# Patient Record
Sex: FEMALE | Race: OTHER | URBAN - METROPOLITAN AREA
[De-identification: names, ages, dates, MRNs, and addresses within clinical notes are randomized per-mention and may not be internally consistent; named-entity substitution may affect disease eponyms.]

---
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
## 2024-01-05 ENCOUNTER — EMERGENCY (EMERGENCY)
Facility: HOSPITAL | Age: 19
LOS: 0 days | Discharge: HOME | End: 2024-01-06
Attending: EMERGENCY MEDICINE
Payer: MEDICAID

## 2024-01-05 VITALS
DIASTOLIC BLOOD PRESSURE: 71 MMHG | WEIGHT: 156.09 LBS | OXYGEN SATURATION: 100 % | RESPIRATION RATE: 18 BRPM | SYSTOLIC BLOOD PRESSURE: 116 MMHG | TEMPERATURE: 98 F | HEART RATE: 75 BPM

## 2024-01-05 DIAGNOSIS — N92.0 EXCESSIVE AND FREQUENT MENSTRUATION WITH REGULAR CYCLE: ICD-10-CM

## 2024-01-05 DIAGNOSIS — R10.2 PELVIC AND PERINEAL PAIN: ICD-10-CM

## 2024-01-05 DIAGNOSIS — N93.9 ABNORMAL UTERINE AND VAGINAL BLEEDING, UNSPECIFIED: ICD-10-CM

## 2024-01-05 PROCEDURE — 99284 EMERGENCY DEPT VISIT MOD MDM: CPT | Mod: 25

## 2024-01-05 PROCEDURE — 81001 URINALYSIS AUTO W/SCOPE: CPT

## 2024-01-05 PROCEDURE — 76856 US EXAM PELVIC COMPLETE: CPT

## 2024-01-05 PROCEDURE — 80053 COMPREHEN METABOLIC PANEL: CPT

## 2024-01-05 PROCEDURE — 85025 COMPLETE CBC W/AUTO DIFF WBC: CPT

## 2024-01-05 PROCEDURE — 36415 COLL VENOUS BLD VENIPUNCTURE: CPT

## 2024-01-05 PROCEDURE — 99284 EMERGENCY DEPT VISIT MOD MDM: CPT

## 2024-01-05 PROCEDURE — 87086 URINE CULTURE/COLONY COUNT: CPT

## 2024-01-05 NOTE — ED PEDIATRIC NURSE NOTE - NS PRO PASSIVE SMOKE EXP
arterial puncture to obtain ABG's/critical patient/monitoring purposes pneumothorax hypertonic/irritant infusion/venous access/critical illness Unknown

## 2024-01-05 NOTE — ED PEDIATRIC TRIAGE NOTE - CHIEF COMPLAINT QUOTE
pt c/o heavy menstrual bleeding associated with pelvic pain for her last 4 cycles. pt reporting clots as well

## 2024-01-05 NOTE — ED PEDIATRIC NURSE NOTE - NSSUHOSCREENINGYN_ED_ALL_ED
Have You Had Laser Removal Of Brown Spots Before?: has not had previous treatment
When Outside In The Sun, Do You...: rarely burns, mostly tans
Yes - the patient is able to be screened

## 2024-01-06 LAB
ALBUMIN SERPL ELPH-MCNC: 4.4 G/DL — SIGNIFICANT CHANGE UP (ref 3.5–5.2)
ALBUMIN SERPL ELPH-MCNC: 4.4 G/DL — SIGNIFICANT CHANGE UP (ref 3.5–5.2)
ALP SERPL-CCNC: 103 U/L — SIGNIFICANT CHANGE UP (ref 30–115)
ALP SERPL-CCNC: 103 U/L — SIGNIFICANT CHANGE UP (ref 30–115)
ALT FLD-CCNC: 142 U/L — HIGH (ref 14–37)
ALT FLD-CCNC: 142 U/L — HIGH (ref 14–37)
ANION GAP SERPL CALC-SCNC: 10 MMOL/L — SIGNIFICANT CHANGE UP (ref 7–14)
ANION GAP SERPL CALC-SCNC: 10 MMOL/L — SIGNIFICANT CHANGE UP (ref 7–14)
APPEARANCE UR: CLEAR — SIGNIFICANT CHANGE UP
APPEARANCE UR: CLEAR — SIGNIFICANT CHANGE UP
AST SERPL-CCNC: 57 U/L — HIGH (ref 14–37)
AST SERPL-CCNC: 57 U/L — HIGH (ref 14–37)
BACTERIA # UR AUTO: NEGATIVE /HPF — SIGNIFICANT CHANGE UP
BACTERIA # UR AUTO: NEGATIVE /HPF — SIGNIFICANT CHANGE UP
BASOPHILS # BLD AUTO: 0.03 K/UL — SIGNIFICANT CHANGE UP (ref 0–0.2)
BASOPHILS # BLD AUTO: 0.03 K/UL — SIGNIFICANT CHANGE UP (ref 0–0.2)
BASOPHILS NFR BLD AUTO: 0.4 % — SIGNIFICANT CHANGE UP (ref 0–1)
BASOPHILS NFR BLD AUTO: 0.4 % — SIGNIFICANT CHANGE UP (ref 0–1)
BILIRUB SERPL-MCNC: 0.7 MG/DL — SIGNIFICANT CHANGE UP (ref 0.2–1.2)
BILIRUB SERPL-MCNC: 0.7 MG/DL — SIGNIFICANT CHANGE UP (ref 0.2–1.2)
BILIRUB UR-MCNC: NEGATIVE — SIGNIFICANT CHANGE UP
BILIRUB UR-MCNC: NEGATIVE — SIGNIFICANT CHANGE UP
BUN SERPL-MCNC: 10 MG/DL — SIGNIFICANT CHANGE UP (ref 10–20)
BUN SERPL-MCNC: 10 MG/DL — SIGNIFICANT CHANGE UP (ref 10–20)
CALCIUM SERPL-MCNC: 9.6 MG/DL — SIGNIFICANT CHANGE UP (ref 8.4–10.5)
CALCIUM SERPL-MCNC: 9.6 MG/DL — SIGNIFICANT CHANGE UP (ref 8.4–10.5)
CAST: 0 /LPF — SIGNIFICANT CHANGE UP (ref 0–4)
CAST: 0 /LPF — SIGNIFICANT CHANGE UP (ref 0–4)
CHLORIDE SERPL-SCNC: 105 MMOL/L — SIGNIFICANT CHANGE UP (ref 98–110)
CHLORIDE SERPL-SCNC: 105 MMOL/L — SIGNIFICANT CHANGE UP (ref 98–110)
CO2 SERPL-SCNC: 26 MMOL/L — SIGNIFICANT CHANGE UP (ref 17–32)
CO2 SERPL-SCNC: 26 MMOL/L — SIGNIFICANT CHANGE UP (ref 17–32)
COLOR SPEC: YELLOW — SIGNIFICANT CHANGE UP
COLOR SPEC: YELLOW — SIGNIFICANT CHANGE UP
CREAT SERPL-MCNC: 0.5 MG/DL — SIGNIFICANT CHANGE UP (ref 0.3–1)
CREAT SERPL-MCNC: 0.5 MG/DL — SIGNIFICANT CHANGE UP (ref 0.3–1)
DIFF PNL FLD: ABNORMAL
DIFF PNL FLD: ABNORMAL
EGFR: 138 ML/MIN/1.73M2 — SIGNIFICANT CHANGE UP
EGFR: 138 ML/MIN/1.73M2 — SIGNIFICANT CHANGE UP
EOSINOPHIL # BLD AUTO: 0.1 K/UL — SIGNIFICANT CHANGE UP (ref 0–0.7)
EOSINOPHIL # BLD AUTO: 0.1 K/UL — SIGNIFICANT CHANGE UP (ref 0–0.7)
EOSINOPHIL NFR BLD AUTO: 1.4 % — SIGNIFICANT CHANGE UP (ref 0–8)
EOSINOPHIL NFR BLD AUTO: 1.4 % — SIGNIFICANT CHANGE UP (ref 0–8)
GLUCOSE SERPL-MCNC: 95 MG/DL — SIGNIFICANT CHANGE UP (ref 70–99)
GLUCOSE SERPL-MCNC: 95 MG/DL — SIGNIFICANT CHANGE UP (ref 70–99)
GLUCOSE UR QL: NEGATIVE MG/DL — SIGNIFICANT CHANGE UP
GLUCOSE UR QL: NEGATIVE MG/DL — SIGNIFICANT CHANGE UP
HCT VFR BLD CALC: 35.9 % — LOW (ref 37–47)
HCT VFR BLD CALC: 35.9 % — LOW (ref 37–47)
HGB BLD-MCNC: 12.3 G/DL — SIGNIFICANT CHANGE UP (ref 12–16)
HGB BLD-MCNC: 12.3 G/DL — SIGNIFICANT CHANGE UP (ref 12–16)
IMM GRANULOCYTES NFR BLD AUTO: 0.3 % — SIGNIFICANT CHANGE UP (ref 0.1–0.3)
IMM GRANULOCYTES NFR BLD AUTO: 0.3 % — SIGNIFICANT CHANGE UP (ref 0.1–0.3)
KETONES UR-MCNC: NEGATIVE MG/DL — SIGNIFICANT CHANGE UP
KETONES UR-MCNC: NEGATIVE MG/DL — SIGNIFICANT CHANGE UP
LEUKOCYTE ESTERASE UR-ACNC: NEGATIVE — SIGNIFICANT CHANGE UP
LEUKOCYTE ESTERASE UR-ACNC: NEGATIVE — SIGNIFICANT CHANGE UP
LYMPHOCYTES # BLD AUTO: 2.02 K/UL — SIGNIFICANT CHANGE UP (ref 1.2–3.4)
LYMPHOCYTES # BLD AUTO: 2.02 K/UL — SIGNIFICANT CHANGE UP (ref 1.2–3.4)
LYMPHOCYTES # BLD AUTO: 29.2 % — SIGNIFICANT CHANGE UP (ref 20.5–51.1)
LYMPHOCYTES # BLD AUTO: 29.2 % — SIGNIFICANT CHANGE UP (ref 20.5–51.1)
MCHC RBC-ENTMCNC: 28.7 PG — SIGNIFICANT CHANGE UP (ref 27–31)
MCHC RBC-ENTMCNC: 28.7 PG — SIGNIFICANT CHANGE UP (ref 27–31)
MCHC RBC-ENTMCNC: 34.3 G/DL — SIGNIFICANT CHANGE UP (ref 32–37)
MCHC RBC-ENTMCNC: 34.3 G/DL — SIGNIFICANT CHANGE UP (ref 32–37)
MCV RBC AUTO: 83.9 FL — SIGNIFICANT CHANGE UP (ref 81–99)
MCV RBC AUTO: 83.9 FL — SIGNIFICANT CHANGE UP (ref 81–99)
MONOCYTES # BLD AUTO: 0.39 K/UL — SIGNIFICANT CHANGE UP (ref 0.1–0.6)
MONOCYTES # BLD AUTO: 0.39 K/UL — SIGNIFICANT CHANGE UP (ref 0.1–0.6)
MONOCYTES NFR BLD AUTO: 5.6 % — SIGNIFICANT CHANGE UP (ref 1.7–9.3)
MONOCYTES NFR BLD AUTO: 5.6 % — SIGNIFICANT CHANGE UP (ref 1.7–9.3)
NEUTROPHILS # BLD AUTO: 4.35 K/UL — SIGNIFICANT CHANGE UP (ref 1.4–6.5)
NEUTROPHILS # BLD AUTO: 4.35 K/UL — SIGNIFICANT CHANGE UP (ref 1.4–6.5)
NEUTROPHILS NFR BLD AUTO: 63.1 % — SIGNIFICANT CHANGE UP (ref 42.2–75.2)
NEUTROPHILS NFR BLD AUTO: 63.1 % — SIGNIFICANT CHANGE UP (ref 42.2–75.2)
NITRITE UR-MCNC: NEGATIVE — SIGNIFICANT CHANGE UP
NITRITE UR-MCNC: NEGATIVE — SIGNIFICANT CHANGE UP
NRBC # BLD: 0 /100 WBCS — SIGNIFICANT CHANGE UP (ref 0–0)
NRBC # BLD: 0 /100 WBCS — SIGNIFICANT CHANGE UP (ref 0–0)
PH UR: 6.5 — SIGNIFICANT CHANGE UP (ref 5–8)
PH UR: 6.5 — SIGNIFICANT CHANGE UP (ref 5–8)
PLATELET # BLD AUTO: 288 K/UL — SIGNIFICANT CHANGE UP (ref 130–400)
PLATELET # BLD AUTO: 288 K/UL — SIGNIFICANT CHANGE UP (ref 130–400)
PMV BLD: 9.2 FL — SIGNIFICANT CHANGE UP (ref 7.4–10.4)
PMV BLD: 9.2 FL — SIGNIFICANT CHANGE UP (ref 7.4–10.4)
POTASSIUM SERPL-MCNC: 4.2 MMOL/L — SIGNIFICANT CHANGE UP (ref 3.5–5)
POTASSIUM SERPL-MCNC: 4.2 MMOL/L — SIGNIFICANT CHANGE UP (ref 3.5–5)
POTASSIUM SERPL-SCNC: 4.2 MMOL/L — SIGNIFICANT CHANGE UP (ref 3.5–5)
POTASSIUM SERPL-SCNC: 4.2 MMOL/L — SIGNIFICANT CHANGE UP (ref 3.5–5)
PROT SERPL-MCNC: 7.3 G/DL — SIGNIFICANT CHANGE UP (ref 6.1–8)
PROT SERPL-MCNC: 7.3 G/DL — SIGNIFICANT CHANGE UP (ref 6.1–8)
PROT UR-MCNC: NEGATIVE MG/DL — SIGNIFICANT CHANGE UP
PROT UR-MCNC: NEGATIVE MG/DL — SIGNIFICANT CHANGE UP
RBC # BLD: 4.28 M/UL — SIGNIFICANT CHANGE UP (ref 4.2–5.4)
RBC # BLD: 4.28 M/UL — SIGNIFICANT CHANGE UP (ref 4.2–5.4)
RBC # FLD: 12.8 % — SIGNIFICANT CHANGE UP (ref 11.5–14.5)
RBC # FLD: 12.8 % — SIGNIFICANT CHANGE UP (ref 11.5–14.5)
RBC CASTS # UR COMP ASSIST: 59 /HPF — HIGH (ref 0–4)
RBC CASTS # UR COMP ASSIST: 59 /HPF — HIGH (ref 0–4)
SODIUM SERPL-SCNC: 141 MMOL/L — SIGNIFICANT CHANGE UP (ref 135–146)
SODIUM SERPL-SCNC: 141 MMOL/L — SIGNIFICANT CHANGE UP (ref 135–146)
SP GR SPEC: 1.01 — SIGNIFICANT CHANGE UP (ref 1–1.03)
SP GR SPEC: 1.01 — SIGNIFICANT CHANGE UP (ref 1–1.03)
SQUAMOUS # UR AUTO: 0 /HPF — SIGNIFICANT CHANGE UP (ref 0–5)
SQUAMOUS # UR AUTO: 0 /HPF — SIGNIFICANT CHANGE UP (ref 0–5)
UROBILINOGEN FLD QL: 0.2 MG/DL — SIGNIFICANT CHANGE UP (ref 0.2–1)
UROBILINOGEN FLD QL: 0.2 MG/DL — SIGNIFICANT CHANGE UP (ref 0.2–1)
WBC # BLD: 6.91 K/UL — SIGNIFICANT CHANGE UP (ref 4.8–10.8)
WBC # BLD: 6.91 K/UL — SIGNIFICANT CHANGE UP (ref 4.8–10.8)
WBC # FLD AUTO: 6.91 K/UL — SIGNIFICANT CHANGE UP (ref 4.8–10.8)
WBC # FLD AUTO: 6.91 K/UL — SIGNIFICANT CHANGE UP (ref 4.8–10.8)
WBC UR QL: 0 /HPF — SIGNIFICANT CHANGE UP (ref 0–5)
WBC UR QL: 0 /HPF — SIGNIFICANT CHANGE UP (ref 0–5)

## 2024-01-06 PROCEDURE — 76856 US EXAM PELVIC COMPLETE: CPT | Mod: 26

## 2024-01-06 RX ORDER — IBUPROFEN 200 MG
400 TABLET ORAL ONCE
Refills: 0 | Status: COMPLETED | OUTPATIENT
Start: 2024-01-06 | End: 2024-01-06

## 2024-01-06 RX ADMIN — Medication 400 MILLIGRAM(S): at 00:45

## 2024-01-06 RX ADMIN — Medication 400 MILLIGRAM(S): at 00:12

## 2024-01-06 NOTE — ED PROVIDER NOTE - PATIENT PORTAL LINK FT
You can access the FollowMyHealth Patient Portal offered by [facility] by registering at the following website: [email or web address]. By joining [employer]’s FollowMyHealth portal, you will also be able to view your health information using other applications (apps) compatible with our system. You can access the FollowMyHealth Patient Portal offered by [facility] by registering at the following website: [email or web address]. By joining [employer]’s FollowMyHealth portal, you will also be able to view your health information using other applications (apps) compatible with our system. You can access the FollowMyHealth Patient Portal offered by [facility] by registering at the following website: [email or web address]. By joining [employer]’s FollowMyHealth portal, you will also be able to view your health information using other applications (apps) compatible with our system.

## 2024-01-06 NOTE — ED PROVIDER NOTE - CARE PROVIDER_API CALL
Yuriy Heredia  Pediatrics  [address] [place] [id number]  Phone: [phone]  Fax: [phone]  Established Patient  Follow Up Time: 1-3 Days   Yuriy Heredia  Pediatrics  [address] [place] [id number]  Phone: [phone]  Fax: [phone]  Established Patient  Follow Up Time: 1-3 Days   Yuriy Heredia  Pediatrics  [address] [place] [id number]  Phone: [phone]  Fax: [phone]  Established Patient  Follow Up Time: 1-3 Days

## 2024-01-06 NOTE — ED PROVIDER NOTE - CLINICAL SUMMARY MEDICAL DECISION MAKING FREE TEXT BOX
Patient presented for evaluation of heavy menstrual period and associated abdominal pain.  While labs and images.  No acute findings.  Patient to be discharged with outpatient follow-up.

## 2024-01-06 NOTE — ED PROVIDER NOTE - PROVIDER TOKENS
PROVIDER:[TOKEN:[[id number]],FOLLOWUP:[1-3 Days],ESTABLISHEDPATIENT:[T]] PROVIDER:[TOKEN:[[id number]],FOLLOWUP:[1-3 Days],ESTABLISHEDPATIENT:[T]] PROVIDER:[TOKEN:[[id number]],FOLLOWUP:[1-3 Days],ESTABLISHEDPATIENT:[T]]

## 2024-01-06 NOTE — ED PROVIDER NOTE - ATTENDING CONTRIBUTION TO CARE
I personally evaluated the patient. I reviewed the Resident’s or Physician Assistant’s note (as assigned above), and agree with the findings and plan except as documented in my note.  18-year-old female, no significant past medical history presents for evaluation of heavy menstruation for the past 2 days.  Patient states that her menstrual was slightly irregular and lasted 5 days however 6 months ago her menstrual timing changed.  Patient states that she did not get her menstrual period for 4 months and then for the past 2 months he has been very heavy lasting at least 7 days with lots of clots.  Patient states that she been having heavy bleeding for the past 2 days, associated with generalized weakness.  Denies chest pain, shortness of breath, LOC.  Denies any use of AP or AC.  States that she has never been sexually active.  VSS, non toxic appearing, NAD, Head NCAT, MMM, neck supple, normal ROM, normal s1s2, lungs ctab, abd s/nt/nd, no guarding or rebound, extremities wnl, AAO x 3, GCS 15, neuro grossly normal. No acute skin lesions. Plan is IV, labs, imaging and reassess.

## 2024-01-06 NOTE — ED PROVIDER NOTE - PROGRESS NOTE DETAILS
Motrin for pain. Labs - CBCd CMP T+S. US pelvis. Labs - Hb 12, stable. Urine negative for UTI. Upreg negative. Pending US. US shows no acute pelvic pathology.

## 2024-01-06 NOTE — ED PROVIDER NOTE - PHYSICAL EXAMINATION
GENERAL: Non toxic, AOx3  HEAD: NCAT  NECK: Supple, normal ROM, no LAD   RS: CTAB, no wheeze  CVS: S1S2 heard  ABDOMEN: Soft, NT, ND, bowel sounds x 4, no rebound/tenderness  NEURO/MSK: Grossly intact

## 2024-01-06 NOTE — ED PROVIDER NOTE - OBJECTIVE STATEMENT
18y F, pmhx irregular periods, presenting for heavy menstrual bleeding x 2 days. Patient endorses irregular menses, with no periods for 4 months. LMP 1/4/24, progressively heavy bleeding with passage of clots, saturating 7 pads over 6 hours on the day of presentation. Endorses lower pelvic pain, not improved by two doses of Tylenol. Denies shortness of breath, palpitations. Never sexually active.

## 2024-01-06 NOTE — ED PROVIDER NOTE - NSFOLLOWUPINSTRUCTIONS_ED_ALL_ED_FT
Menorrhagia    Menorrhagia is the medical term for when your menstrual periods are heavy or last longer than usual. With menorrhagia, every period you have may cause enough blood loss and cramping that you are unable to maintain your usual activities.     CAUSES  In some cases, the cause of heavy periods is unknown, but a number of conditions may cause menorrhagia. Common causes include:    A problem with the hormone-producing thyroid gland (hypothyroid).  Noncancerous growths in the uterus (polyps or fibroids).  An imbalance of the estrogen and progesterone hormones.  One of your ovaries not releasing an egg during one or more months.   Side effects of having an intrauterine device (IUD).  Side effects of some medicines, such as anti-inflammatory medicines or blood thinners.  A bleeding disorder that stops your blood from clotting normally.     SIGNS AND SYMPTOMS  During a normal period, bleeding lasts between 4 and 8 days. Signs that your periods are too heavy include:    You routinely have to change your pad or tampon every 1 or 2 hours because it is completely soaked.  You pass blood clots larger than 1 inch (2.5 cm) in size.  You have bleeding for more than 7 days.  You need to use pads and tampons at the same time because of heavy bleeding.   You need to wake up to change your pads or tampons during the night.  You have symptoms of anemia, such as tiredness, fatigue, or shortness of breath.     DIAGNOSIS  Your health care provider will perform a physical exam and ask you questions about your symptoms and menstrual history. Other tests may be ordered based on what the health care provider finds during the exam. These tests can include:    Blood tests. Blood tests are used to check if you are pregnant or have hormonal changes, a bleeding or thyroid disorder, low iron levels (anemia), or other problems.  Endometrial biopsy. Your health care provider takes a sample of tissue from the inside of your uterus to be examined under a microscope.  Pelvic ultrasound. This test uses sound waves to make a picture of your uterus, ovaries, and vagina. The pictures can show if you have fibroids or other growths.   Hysteroscopy. For this test, your health care provider will use a small telescope to look inside your uterus.    Based on the results of your initial tests, your health care provider may recommend further testing.    TREATMENT  Treatment may not be needed. If it is needed, your health care provider may recommend treatment with one or more medicines first. If these do not reduce bleeding enough, a surgical treatment might be an option. The best treatment for you will depend on:     Whether you need to prevent pregnancy.    Your desire to have children in the future.   The cause and severity of your bleeding.   Your opinion and personal preference.      Medicines for menorrhagia may include:    Birth control methods that use hormones. These include the pill, skin patch, vaginal ring, shots that you get every 3 months, hormonal IUD, and implant. These treatments reduce bleeding during your menstrual period.   Medicines that thicken blood and slow bleeding.  Medicines that reduce swelling, such as ibuprofen.   Medicines that contain a synthetic hormone called progestin.    Medicines that make the ovaries stop working for a short time.      You may need surgical treatment for menorrhagia if the medicines are unsuccessful. Treatment options include:    Dilation and curettage (D&C). In this procedure, your health care provider opens (dilates) your cervix and then scrapes or suctions tissue from the lining of your uterus to reduce menstrual bleeding.   Operative hysteroscopy. This procedure uses a tiny tube with a light (hysteroscope) to view your uterine cavity and can help in the surgical removal of a polyp that may be causing heavy periods.  Endometrial ablation. Through various techniques, your health care provider permanently destroys the entire lining of your uterus (endometrium). After endometrial ablation, most women have little or no menstrual flow. Endometrial ablation reduces your ability to become pregnant.  Endometrial resection. This surgical procedure uses an electrosurgical wire loop to remove the lining of the uterus. This procedure also reduces your ability to become pregnant.  Hysterectomy. Surgical removal of the uterus and cervix is a permanent procedure that stops menstrual periods. Pregnancy is not possible after a hysterectomy. This procedure requires anesthesia and hospitalization.    HOME CARE INSTRUCTIONS  Only take over-the-counter or prescription medicines as directed by your health care provider. Take prescribed medicines exactly as directed. Do not change or switch medicines without consulting your health care provider.  Take any prescribed iron pills exactly as directed by your health care provider. Long-term heavy bleeding may result in low iron levels. Iron pills help replace the iron your body lost from heavy bleeding. Iron may cause constipation. If this becomes a problem, increase the bran, fruits, and roughage in your diet.  Do not take aspirin or medicines that contain aspirin 1 week before or during your menstrual period. Aspirin may make the bleeding worse.  If you need to change your sanitary pad or tampon more than once every 2 hours, stay in bed and rest as much as possible until the bleeding stops.  Eat well-balanced meals. Eat foods high in iron. Examples are leafy green vegetables, meat, liver, eggs, and whole grain breads and cereals. Do not try to lose weight until the abnormal bleeding has stopped and your blood iron level is back to normal.    SEEK MEDICAL CARE IF:  You soak through a pad or tampon every 1 or 2 hours, and this happens every time you have a period.  You need to use pads and tampons at the same time because you are bleeding so much.  You need to change your pad or tampon during the night.  You have a period that lasts for more than 8 days.  You pass clots bigger than 1 inch wide.  You have irregular periods that happen more or less often than once a month.  You feel dizzy or faint.  You feel very weak or tired.  You feel short of breath or feel your heart is beating too fast when you exercise.  You have nausea and vomiting or diarrhea while you are taking your medicine.  You have any problems that may be related to the medicine you are taking.    SEEK IMMEDIATE MEDICAL CARE IF:  You soak through 4 or more pads or tampons in 2 hours.   You have any bleeding while you are pregnant.    MAKE SURE YOU:  Understand these instructions.   Will watch your condition.  Will get help right away if you are not doing well or get worse.    ADDITIONAL NOTES AND INSTRUCTIONS    Please follow up with your Primary MD in 24-48 hr.  Seek immediate medical care for any new/worsening signs or symptoms. > May take Tylenol / Motrin for pain   > Follow up with your pediatrician in 1-3 days   > Follow up with Gynecology to establish care    Our Emergency Department Referral Coordinators will be reaching out ot you in the next 24-48 hours from 9:00am to 5:00pm (Monday to Friday) with a follow up appointment. Please expect a phone call from the hospital in that time frame. If you do not receive a call or if you have any questions or concerns, you can reach them at [phone] or [phone].    Menorrhagia    Menorrhagia is the medical term for when your menstrual periods are heavy or last longer than usual. With menorrhagia, every period you have may cause enough blood loss and cramping that you are unable to maintain your usual activities.     CAUSES  In some cases, the cause of heavy periods is unknown, but a number of conditions may cause menorrhagia. Common causes include:    A problem with the hormone-producing thyroid gland (hypothyroid).  Noncancerous growths in the uterus (polyps or fibroids).  An imbalance of the estrogen and progesterone hormones.  One of your ovaries not releasing an egg during one or more months.   Side effects of having an intrauterine device (IUD).  Side effects of some medicines, such as anti-inflammatory medicines or blood thinners.  A bleeding disorder that stops your blood from clotting normally.     SIGNS AND SYMPTOMS  During a normal period, bleeding lasts between 4 and 8 days. Signs that your periods are too heavy include:    You routinely have to change your pad or tampon every 1 or 2 hours because it is completely soaked.  You pass blood clots larger than 1 inch (2.5 cm) in size.  You have bleeding for more than 7 days.  You need to use pads and tampons at the same time because of heavy bleeding.   You need to wake up to change your pads or tampons during the night.  You have symptoms of anemia, such as tiredness, fatigue, or shortness of breath.     DIAGNOSIS  Your health care provider will perform a physical exam and ask you questions about your symptoms and menstrual history. Other tests may be ordered based on what the health care provider finds during the exam. These tests can include:    Blood tests. Blood tests are used to check if you are pregnant or have hormonal changes, a bleeding or thyroid disorder, low iron levels (anemia), or other problems.  Endometrial biopsy. Your health care provider takes a sample of tissue from the inside of your uterus to be examined under a microscope.  Pelvic ultrasound. This test uses sound waves to make a picture of your uterus, ovaries, and vagina. The pictures can show if you have fibroids or other growths.   Hysteroscopy. For this test, your health care provider will use a small telescope to look inside your uterus.    Based on the results of your initial tests, your health care provider may recommend further testing.    TREATMENT  Treatment may not be needed. If it is needed, your health care provider may recommend treatment with one or more medicines first. If these do not reduce bleeding enough, a surgical treatment might be an option. The best treatment for you will depend on:     Whether you need to prevent pregnancy.    Your desire to have children in the future.   The cause and severity of your bleeding.   Your opinion and personal preference.      Medicines for menorrhagia may include:    Birth control methods that use hormones. These include the pill, skin patch, vaginal ring, shots that you get every 3 months, hormonal IUD, and implant. These treatments reduce bleeding during your menstrual period.   Medicines that thicken blood and slow bleeding.  Medicines that reduce swelling, such as ibuprofen.   Medicines that contain a synthetic hormone called progestin.    Medicines that make the ovaries stop working for a short time.      You may need surgical treatment for menorrhagia if the medicines are unsuccessful. Treatment options include:    Dilation and curettage (D&C). In this procedure, your health care provider opens (dilates) your cervix and then scrapes or suctions tissue from the lining of your uterus to reduce menstrual bleeding.   Operative hysteroscopy. This procedure uses a tiny tube with a light (hysteroscope) to view your uterine cavity and can help in the surgical removal of a polyp that may be causing heavy periods.  Endometrial ablation. Through various techniques, your health care provider permanently destroys the entire lining of your uterus (endometrium). After endometrial ablation, most women have little or no menstrual flow. Endometrial ablation reduces your ability to become pregnant.  Endometrial resection. This surgical procedure uses an electrosurgical wire loop to remove the lining of the uterus. This procedure also reduces your ability to become pregnant.  Hysterectomy. Surgical removal of the uterus and cervix is a permanent procedure that stops menstrual periods. Pregnancy is not possible after a hysterectomy. This procedure requires anesthesia and hospitalization.    HOME CARE INSTRUCTIONS  Only take over-the-counter or prescription medicines as directed by your health care provider. Take prescribed medicines exactly as directed. Do not change or switch medicines without consulting your health care provider.  Take any prescribed iron pills exactly as directed by your health care provider. Long-term heavy bleeding may result in low iron levels. Iron pills help replace the iron your body lost from heavy bleeding. Iron may cause constipation. If this becomes a problem, increase the bran, fruits, and roughage in your diet.  Do not take aspirin or medicines that contain aspirin 1 week before or during your menstrual period. Aspirin may make the bleeding worse.  If you need to change your sanitary pad or tampon more than once every 2 hours, stay in bed and rest as much as possible until the bleeding stops.  Eat well-balanced meals. Eat foods high in iron. Examples are leafy green vegetables, meat, liver, eggs, and whole grain breads and cereals. Do not try to lose weight until the abnormal bleeding has stopped and your blood iron level is back to normal.    SEEK MEDICAL CARE IF:  You soak through a pad or tampon every 1 or 2 hours, and this happens every time you have a period.  You need to use pads and tampons at the same time because you are bleeding so much.  You need to change your pad or tampon during the night.  You have a period that lasts for more than 8 days.  You pass clots bigger than 1 inch wide.  You have irregular periods that happen more or less often than once a month.  You feel dizzy or faint.  You feel very weak or tired.  You feel short of breath or feel your heart is beating too fast when you exercise.  You have nausea and vomiting or diarrhea while you are taking your medicine.  You have any problems that may be related to the medicine you are taking.    SEEK IMMEDIATE MEDICAL CARE IF:  You soak through 4 or more pads or tampons in 2 hours.   You have any bleeding while you are pregnant.    MAKE SURE YOU:  Understand these instructions.   Will watch your condition.  Will get help right away if you are not doing well or get worse.    ADDITIONAL NOTES AND INSTRUCTIONS    Please follow up with your Primary MD in 24-48 hr.  Seek immediate medical care for any new/worsening signs or symptoms. > May take Tylenol / Motrin for pain   > Follow up with your pediatrician in 1-3 days   > Follow up with Gynecology to establish care    Our Emergency Department Referral Coordinators will be reaching out ot you in the next 24-48 hours from 9:00am to 5:00pm (Monday to Friday) with a follow up appointment. Please expect a phone call from the hospital in that time frame. If you do not receive a call or if you have any questions or concerns, you can reach them at [phone] or [phone].    Menorrhagia    Menorrhagia is the medical term for when your menstrual periods are heavy or last longer than usual. With menorrhagia, every period you have may cause enough blood loss and cramping that you are unable to maintain your usual activities.     CAUSES  In some cases, the cause of heavy periods is unknown, but a number of conditions may cause menorrhagia. Common causes include:    A problem with the hormone-producing thyroid gland (hypothyroid).  Noncancerous growths in the uterus (polyps or fibroids).  An imbalance of the estrogen and progesterone hormones.  One of your ovaries not releasing an egg during one or more months.   Side effects of having an intrauterine device (IUD).  Side effects of some medicines, such as anti-inflammatory medicines or blood thinners.  A bleeding disorder that stops your blood from clotting normally.     SIGNS AND SYMPTOMS  During a normal period, bleeding lasts between 4 and 8 days. Signs that your periods are too heavy include:    You routinely have to change your pad or tampon every 1 or 2 hours because it is completely soaked.  You pass blood clots larger than 1 inch (2.5 cm) in size.  You have bleeding for more than 7 days.  You need to use pads and tampons at the same time because of heavy bleeding.   You need to wake up to change your pads or tampons during the night.  You have symptoms of anemia, such as tiredness, fatigue, or shortness of breath.     DIAGNOSIS  Your health care provider will perform a physical exam and ask you questions about your symptoms and menstrual history. Other tests may be ordered based on what the health care provider finds during the exam. These tests can include:    Blood tests. Blood tests are used to check if you are pregnant or have hormonal changes, a bleeding or thyroid disorder, low iron levels (anemia), or other problems.  Endometrial biopsy. Your health care provider takes a sample of tissue from the inside of your uterus to be examined under a microscope.  Pelvic ultrasound. This test uses sound waves to make a picture of your uterus, ovaries, and vagina. The pictures can show if you have fibroids or other growths.   Hysteroscopy. For this test, your health care provider will use a small telescope to look inside your uterus.    Based on the results of your initial tests, your health care provider may recommend further testing.    TREATMENT  Treatment may not be needed. If it is needed, your health care provider may recommend treatment with one or more medicines first. If these do not reduce bleeding enough, a surgical treatment might be an option. The best treatment for you will depend on:     Whether you need to prevent pregnancy.    Your desire to have children in the future.   The cause and severity of your bleeding.   Your opinion and personal preference.      Medicines for menorrhagia may include:    Birth control methods that use hormones. These include the pill, skin patch, vaginal ring, shots that you get every 3 months, hormonal IUD, and implant. These treatments reduce bleeding during your menstrual period.   Medicines that thicken blood and slow bleeding.  Medicines that reduce swelling, such as ibuprofen.   Medicines that contain a synthetic hormone called progestin.    Medicines that make the ovaries stop working for a short time.      You may need surgical treatment for menorrhagia if the medicines are unsuccessful. Treatment options include:    Dilation and curettage (D&C). In this procedure, your health care provider opens (dilates) your cervix and then scrapes or suctions tissue from the lining of your uterus to reduce menstrual bleeding.   Operative hysteroscopy. This procedure uses a tiny tube with a light (hysteroscope) to view your uterine cavity and can help in the surgical removal of a polyp that may be causing heavy periods.  Endometrial ablation. Through various techniques, your health care provider permanently destroys the entire lining of your uterus (endometrium). After endometrial ablation, most women have little or no menstrual flow. Endometrial ablation reduces your ability to become pregnant.  Endometrial resection. This surgical procedure uses an electrosurgical wire loop to remove the lining of the uterus. This procedure also reduces your ability to become pregnant.  Hysterectomy. Surgical removal of the uterus and cervix is a permanent procedure that stops menstrual periods. Pregnancy is not possible after a hysterectomy. This procedure requires anesthesia and hospitalization.    HOME CARE INSTRUCTIONS  Only take over-the-counter or prescription medicines as directed by your health care provider. Take prescribed medicines exactly as directed. Do not change or switch medicines without consulting your health care provider.  Take any prescribed iron pills exactly as directed by your health care provider. Long-term heavy bleeding may result in low iron levels. Iron pills help replace the iron your body lost from heavy bleeding. Iron may cause constipation. If this becomes a problem, increase the bran, fruits, and roughage in your diet.  Do not take aspirin or medicines that contain aspirin 1 week before or during your menstrual period. Aspirin may make the bleeding worse.  If you need to change your sanitary pad or tampon more than once every 2 hours, stay in bed and rest as much as possible until the bleeding stops.  Eat well-balanced meals. Eat foods high in iron. Examples are leafy green vegetables, meat, liver, eggs, and whole grain breads and cereals. Do not try to lose weight until the abnormal bleeding has stopped and your blood iron level is back to normal.    SEEK MEDICAL CARE IF:  You soak through a pad or tampon every 1 or 2 hours, and this happens every time you have a period.  You need to use pads and tampons at the same time because you are bleeding so much.  You need to change your pad or tampon during the night.  You have a period that lasts for more than 8 days.  You pass clots bigger than 1 inch wide.  You have irregular periods that happen more or less often than once a month.  You feel dizzy or faint.  You feel very weak or tired.  You feel short of breath or feel your heart is beating too fast when you exercise.  You have nausea and vomiting or diarrhea while you are taking your medicine.  You have any problems that may be related to the medicine you are taking.    SEEK IMMEDIATE MEDICAL CARE IF:  You soak through 4 or more pads or tampons in 2 hours.   You have any bleeding while you are pregnant.    MAKE SURE YOU:  Understand these instructions.   Will watch your condition.  Will get help right away if you are not doing well or get worse.    ADDITIONAL NOTES AND INSTRUCTIONS    Please follow up with your Primary MD in 24-48 hr.  Seek immediate medical care for any new/worsening signs or symptoms. > May take Tylenol / Motrin for pain   > Follow up with your pediatrician in 1-3 days   > Follow up with Gynecology to establish care    Our Emergency Department Referral Coordinators will be reaching out ot you in the next 24-48 hours from 9:00am to 5:00pm (Monday to Friday) with a follow up appointment. Please expect a phone call from the hospital in that time frame. If you do not receive a call or if you have any questions or concerns, you can reach them at [phone] or [phone].    Menorrhagia    Menorrhagia is the medical term for when your menstrual periods are heavy or last longer than usual. With menorrhagia, every period you have may cause enough blood loss and cramping that you are unable to maintain your usual activities.     CAUSES  In some cases, the cause of heavy periods is unknown, but a number of conditions may cause menorrhagia. Common causes include:    A problem with the hormone-producing thyroid gland (hypothyroid).  Noncancerous growths in the uterus (polyps or fibroids).  An imbalance of the estrogen and progesterone hormones.  One of your ovaries not releasing an egg during one or more months.   Side effects of having an intrauterine device (IUD).  Side effects of some medicines, such as anti-inflammatory medicines or blood thinners.  A bleeding disorder that stops your blood from clotting normally.     SIGNS AND SYMPTOMS  During a normal period, bleeding lasts between 4 and 8 days. Signs that your periods are too heavy include:    You routinely have to change your pad or tampon every 1 or 2 hours because it is completely soaked.  You pass blood clots larger than 1 inch (2.5 cm) in size.  You have bleeding for more than 7 days.  You need to use pads and tampons at the same time because of heavy bleeding.   You need to wake up to change your pads or tampons during the night.  You have symptoms of anemia, such as tiredness, fatigue, or shortness of breath.     DIAGNOSIS  Your health care provider will perform a physical exam and ask you questions about your symptoms and menstrual history. Other tests may be ordered based on what the health care provider finds during the exam. These tests can include:    Blood tests. Blood tests are used to check if you are pregnant or have hormonal changes, a bleeding or thyroid disorder, low iron levels (anemia), or other problems.  Endometrial biopsy. Your health care provider takes a sample of tissue from the inside of your uterus to be examined under a microscope.  Pelvic ultrasound. This test uses sound waves to make a picture of your uterus, ovaries, and vagina. The pictures can show if you have fibroids or other growths.   Hysteroscopy. For this test, your health care provider will use a small telescope to look inside your uterus.    Based on the results of your initial tests, your health care provider may recommend further testing.    TREATMENT  Treatment may not be needed. If it is needed, your health care provider may recommend treatment with one or more medicines first. If these do not reduce bleeding enough, a surgical treatment might be an option. The best treatment for you will depend on:     Whether you need to prevent pregnancy.    Your desire to have children in the future.   The cause and severity of your bleeding.   Your opinion and personal preference.      Medicines for menorrhagia may include:    Birth control methods that use hormones. These include the pill, skin patch, vaginal ring, shots that you get every 3 months, hormonal IUD, and implant. These treatments reduce bleeding during your menstrual period.   Medicines that thicken blood and slow bleeding.  Medicines that reduce swelling, such as ibuprofen.   Medicines that contain a synthetic hormone called progestin.    Medicines that make the ovaries stop working for a short time.      You may need surgical treatment for menorrhagia if the medicines are unsuccessful. Treatment options include:    Dilation and curettage (D&C). In this procedure, your health care provider opens (dilates) your cervix and then scrapes or suctions tissue from the lining of your uterus to reduce menstrual bleeding.   Operative hysteroscopy. This procedure uses a tiny tube with a light (hysteroscope) to view your uterine cavity and can help in the surgical removal of a polyp that may be causing heavy periods.  Endometrial ablation. Through various techniques, your health care provider permanently destroys the entire lining of your uterus (endometrium). After endometrial ablation, most women have little or no menstrual flow. Endometrial ablation reduces your ability to become pregnant.  Endometrial resection. This surgical procedure uses an electrosurgical wire loop to remove the lining of the uterus. This procedure also reduces your ability to become pregnant.  Hysterectomy. Surgical removal of the uterus and cervix is a permanent procedure that stops menstrual periods. Pregnancy is not possible after a hysterectomy. This procedure requires anesthesia and hospitalization.    HOME CARE INSTRUCTIONS  Only take over-the-counter or prescription medicines as directed by your health care provider. Take prescribed medicines exactly as directed. Do not change or switch medicines without consulting your health care provider.  Take any prescribed iron pills exactly as directed by your health care provider. Long-term heavy bleeding may result in low iron levels. Iron pills help replace the iron your body lost from heavy bleeding. Iron may cause constipation. If this becomes a problem, increase the bran, fruits, and roughage in your diet.  Do not take aspirin or medicines that contain aspirin 1 week before or during your menstrual period. Aspirin may make the bleeding worse.  If you need to change your sanitary pad or tampon more than once every 2 hours, stay in bed and rest as much as possible until the bleeding stops.  Eat well-balanced meals. Eat foods high in iron. Examples are leafy green vegetables, meat, liver, eggs, and whole grain breads and cereals. Do not try to lose weight until the abnormal bleeding has stopped and your blood iron level is back to normal.    SEEK MEDICAL CARE IF:  You soak through a pad or tampon every 1 or 2 hours, and this happens every time you have a period.  You need to use pads and tampons at the same time because you are bleeding so much.  You need to change your pad or tampon during the night.  You have a period that lasts for more than 8 days.  You pass clots bigger than 1 inch wide.  You have irregular periods that happen more or less often than once a month.  You feel dizzy or faint.  You feel very weak or tired.  You feel short of breath or feel your heart is beating too fast when you exercise.  You have nausea and vomiting or diarrhea while you are taking your medicine.  You have any problems that may be related to the medicine you are taking.    SEEK IMMEDIATE MEDICAL CARE IF:  You soak through 4 or more pads or tampons in 2 hours.   You have any bleeding while you are pregnant.    MAKE SURE YOU:  Understand these instructions.   Will watch your condition.  Will get help right away if you are not doing well or get worse.    ADDITIONAL NOTES AND INSTRUCTIONS    Please follow up with your Primary MD in 24-48 hr.  Seek immediate medical care for any new/worsening signs or symptoms.

## 2024-01-06 NOTE — ED PROVIDER NOTE - NSFOLLOWUPCLINICS_GEN_ALL_ED_FT
[facility]  OB/GYN  [address] [place] [address]  Phone: [phone]  Fax:   Follow Up Time: Routine     [facility]  OB/GYN  [address] [place] [address]  Phone: [phone]  Fax:   Follow Up Time: Routine     [facility]  OB/GYN  [address] [place] [address]  Phone: [phone]  Fax:   Follow Up Time: Routine

## 2024-01-07 LAB
CULTURE RESULTS: SIGNIFICANT CHANGE UP
CULTURE RESULTS: SIGNIFICANT CHANGE UP
SPECIMEN SOURCE: SIGNIFICANT CHANGE UP
SPECIMEN SOURCE: SIGNIFICANT CHANGE UP

## 2024-07-18 ENCOUNTER — EMERGENCY (EMERGENCY)
Facility: HOSPITAL | Age: 19
LOS: 0 days | Discharge: HOME | End: 2024-07-19
Attending: STUDENT IN AN ORGANIZED HEALTH CARE EDUCATION/TRAINING PROGRAM
Payer: SELF-PAY

## 2024-07-18 VITALS
OXYGEN SATURATION: 99 % | WEIGHT: 145.06 LBS | DIASTOLIC BLOOD PRESSURE: 73 MMHG | TEMPERATURE: 98 F | RESPIRATION RATE: 18 BRPM | SYSTOLIC BLOOD PRESSURE: 110 MMHG | HEART RATE: 77 BPM | HEIGHT: 61 IN

## 2024-07-18 DIAGNOSIS — M54.50 LOW BACK PAIN, UNSPECIFIED: ICD-10-CM

## 2024-07-18 LAB
APPEARANCE UR: CLEAR — SIGNIFICANT CHANGE UP
BILIRUB UR-MCNC: NEGATIVE — SIGNIFICANT CHANGE UP
COLOR SPEC: YELLOW — SIGNIFICANT CHANGE UP
DIFF PNL FLD: ABNORMAL
GLUCOSE UR QL: NEGATIVE MG/DL — SIGNIFICANT CHANGE UP
KETONES UR-MCNC: NEGATIVE MG/DL — SIGNIFICANT CHANGE UP
LEUKOCYTE ESTERASE UR-ACNC: NEGATIVE — SIGNIFICANT CHANGE UP
NITRITE UR-MCNC: NEGATIVE — SIGNIFICANT CHANGE UP
PH UR: 6.5 — SIGNIFICANT CHANGE UP (ref 5–8)
PROT UR-MCNC: SIGNIFICANT CHANGE UP MG/DL
SP GR SPEC: 1.02 — SIGNIFICANT CHANGE UP (ref 1–1.03)
UROBILINOGEN FLD QL: 1 MG/DL — SIGNIFICANT CHANGE UP (ref 0.2–1)

## 2024-07-18 PROCEDURE — 99053 MED SERV 10PM-8AM 24 HR FAC: CPT

## 2024-07-18 PROCEDURE — 96372 THER/PROPH/DIAG INJ SC/IM: CPT

## 2024-07-18 PROCEDURE — 99284 EMERGENCY DEPT VISIT MOD MDM: CPT

## 2024-07-18 PROCEDURE — 81001 URINALYSIS AUTO W/SCOPE: CPT

## 2024-07-18 PROCEDURE — 99283 EMERGENCY DEPT VISIT LOW MDM: CPT | Mod: 25

## 2024-07-18 RX ORDER — METHOCARBAMOL 500 MG
1500 TABLET ORAL ONCE
Refills: 0 | Status: COMPLETED | OUTPATIENT
Start: 2024-07-18 | End: 2024-07-18

## 2024-07-18 RX ORDER — LIDOCAINE HCL 28 MG/G
1 GEL TOPICAL ONCE
Refills: 0 | Status: COMPLETED | OUTPATIENT
Start: 2024-07-18 | End: 2024-07-18

## 2024-07-18 RX ORDER — KETOROLAC TROMETHAMINE 30 MG/ML
30 INJECTION, SOLUTION INTRAMUSCULAR ONCE
Refills: 0 | Status: DISCONTINUED | OUTPATIENT
Start: 2024-07-18 | End: 2024-07-18

## 2024-07-18 RX ADMIN — LIDOCAINE HCL 1 PATCH: 28 GEL TOPICAL at 23:30

## 2024-07-18 RX ADMIN — KETOROLAC TROMETHAMINE 30 MILLIGRAM(S): 30 INJECTION, SOLUTION INTRAMUSCULAR at 23:30

## 2024-07-18 RX ADMIN — Medication 1500 MILLIGRAM(S): at 23:30

## 2024-07-19 PROBLEM — N92.6 IRREGULAR MENSTRUATION, UNSPECIFIED: Chronic | Status: ACTIVE | Noted: 2024-01-06

## 2024-07-19 LAB
BACTERIA # UR AUTO: ABNORMAL /HPF
CAST: 1 /LPF — SIGNIFICANT CHANGE UP (ref 0–4)
MUCOUS THREADS # UR AUTO: PRESENT
RBC CASTS # UR COMP ASSIST: 5 /HPF — HIGH (ref 0–4)
SQUAMOUS # UR AUTO: 3 /HPF — SIGNIFICANT CHANGE UP (ref 0–5)
URATE CRY FLD QL MICRO: PRESENT
WBC UR QL: 2 /HPF — SIGNIFICANT CHANGE UP (ref 0–5)

## 2024-07-19 RX ORDER — LIDOCAINE HCL 28 MG/G
1 GEL TOPICAL
Qty: 2 | Refills: 0
Start: 2024-07-19 | End: 2024-07-25

## 2024-07-19 RX ORDER — METHOCARBAMOL 500 MG
2 TABLET ORAL
Qty: 14 | Refills: 0
Start: 2024-07-19 | End: 2024-07-25

## 2024-07-19 RX ADMIN — KETOROLAC TROMETHAMINE 30 MILLIGRAM(S): 30 INJECTION, SOLUTION INTRAMUSCULAR at 00:05
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
## 2025-03-30 ENCOUNTER — EMERGENCY (EMERGENCY)
Facility: HOSPITAL | Age: 20
LOS: 0 days | Discharge: HOME | End: 2025-03-30
Attending: STUDENT IN AN ORGANIZED HEALTH CARE EDUCATION/TRAINING PROGRAM
Payer: MEDICAID

## 2025-03-30 VITALS
RESPIRATION RATE: 18 BRPM | SYSTOLIC BLOOD PRESSURE: 108 MMHG | HEART RATE: 78 BPM | DIASTOLIC BLOOD PRESSURE: 57 MMHG | OXYGEN SATURATION: 99 % | TEMPERATURE: 99 F | WEIGHT: 145.28 LBS

## 2025-03-30 DIAGNOSIS — H60.91 UNSPECIFIED OTITIS EXTERNA, RIGHT EAR: ICD-10-CM

## 2025-03-30 DIAGNOSIS — S00.411A ABRASION OF RIGHT EAR, INITIAL ENCOUNTER: ICD-10-CM

## 2025-03-30 DIAGNOSIS — H92.01 OTALGIA, RIGHT EAR: ICD-10-CM
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
## 2025-03-30 PROCEDURE — 99283 EMERGENCY DEPT VISIT LOW MDM: CPT

## 2025-03-30 RX ORDER — CIPROFLOXACIN 6 MG/ML
1 SUSPENSION INTRATYMPANIC
Refills: 0
Start: 2025-03-30

## 2025-03-30 RX ADMIN — Medication 5 DROP(S): at 17:10

## 2025-03-30 NOTE — ED PROVIDER NOTE - OBJECTIVE STATEMENT
18y/o F with medical history of frequent otitis externa presenting for ear pain. Patient has a tendency to pick her ears, causing frequent ear infections. Patient began feeling a twinge of pain in her right ear 1 day prior to presentation. She thought she may have caused an abrasion in her ear and therefore placed petroleum jelly in her ear canal overnight. This morning, patient woke up from sleep and was experiencing a lot more pain in her right ear, especially to palpation. Patient also endorses minimal white drainage from the right ear as well as increased pain when moving her jaw, talking, or chewing. Patient denies fever, difficulty hearing, or recent swimming.

## 2025-03-30 NOTE — ED PROVIDER NOTE - PHYSICAL EXAMINATION
GENERAL: well-appearing, well nourished, no acute distress  HEENT: NCAT, conjunctiva clear and not injected, sclera non-icteric, (+)Superior abrasion in R external ear canal, (+)pain with palpation of right tragus and pulling of pinna, no ear proptosis, (+)minimal white drainage in R external ear canal, TMs nonbulging/nonerythematous, no cervical lymphadenomathy

## 2025-03-30 NOTE — ED PROVIDER NOTE - PATIENT PORTAL LINK FT
You can access the FollowMyHealth Patient Portal offered by [facility] by registering at the following website: [email or web address]. By joining [facility]’s FollowMyHealth portal, you will also be able to view your health information using other applications (apps) compatible with our system.

## 2025-03-30 NOTE — ED PROVIDER NOTE - NSFOLLOWUPINSTRUCTIONS_ED_ALL_ED_FT
Please use 5 Ciprofloxacin drops in the right ear every 12hrs for the next 7 days.  Please see your pediatrician, Dr. Heredia, in 1-3 days.   .  Follow these instructions at home:  If you were prescribed antibiotic ear drops, use them as told by your health care provider. Do not stop using the antibiotic even if you start to feel better.  Take over-the-counter and prescription medicines only as told by your health care provider.  Avoid getting water in your ears as told by your health care provider. This may include avoiding swimming or water sports for a few days.  Keep all follow-up visits. This is important.  How is this prevented?  Keep your ears dry. Use the corner of a towel to dry your ears after you swim or bathe.  Avoid scratching or putting things in your ear. Doing these things can damage the ear canal or remove the protective wax that lines it, which makes it easier for bacteria and funguses to grow.  Avoid swimming in lakes, polluted water, or swimming pools that may not have enough chlorine.  Contact a health care provider if:  You have a fever.  Your ear is still red, swollen, painful, or draining pus after 3 days.  Your redness, swelling, or pain gets worse.  You have a severe headache.  Get help right away if:  You have redness, swelling, and pain or tenderness in the area behind your ear.

## 2025-03-30 NOTE — ED PEDIATRIC TRIAGE NOTE - NS ED NURSE BANDS TYPE
,[email or web address] Name band;

## 2025-03-30 NOTE — ED PROVIDER NOTE - CLINICAL SUMMARY MEDICAL DECISION MAKING FREE TEXT BOX
19-year-old female with history of otitis externa presenting today with right ear pain.  Patient states that she has had right ear pain for 2 days, does have history of scratching her ear.  Denies fevers.  Denies trauma.  On exam, tenderness to palpation of the right tragus, no mastoid tenderness.  Pain with tragus and pinna maneuver right TM unremarkable.  Left ear unremarkable.  No lesions or vesicles noted.  Will empirically treat for otitis externa.

## 2025-03-30 NOTE — ED PROVIDER NOTE - CARE PROVIDER_API CALL
Yuriy Heredia  Pediatrics  [address] [place] [id number]  Phone: [phone]  Fax: [phone]  Follow Up Time: 1-3 Days

## 2025-03-31 PROBLEM — M51.26 OTHER INTERVERTEBRAL DISC DISPLACEMENT, LUMBAR REGION: Chronic | Status: ACTIVE | Noted: 2024-07-19
